# Patient Record
Sex: FEMALE | Race: WHITE | NOT HISPANIC OR LATINO | URBAN - METROPOLITAN AREA
[De-identification: names, ages, dates, MRNs, and addresses within clinical notes are randomized per-mention and may not be internally consistent; named-entity substitution may affect disease eponyms.]

---

## 2022-10-13 ENCOUNTER — APPOINTMENT (OUTPATIENT)
Dept: URBAN - METROPOLITAN AREA CLINIC 285 | Age: 57
Setting detail: DERMATOLOGY
End: 2022-10-14

## 2022-10-13 DIAGNOSIS — L65.9 NONSCARRING HAIR LOSS, UNSPECIFIED: ICD-10-CM

## 2022-10-13 PROCEDURE — 99202 OFFICE O/P NEW SF 15 MIN: CPT

## 2022-10-13 PROCEDURE — OTHER PRESCRIPTION: OTHER

## 2022-10-13 PROCEDURE — OTHER MIPS QUALITY: OTHER

## 2022-10-13 PROCEDURE — OTHER DEFER: OTHER

## 2022-10-13 PROCEDURE — OTHER MEDICATION COUNSELING: OTHER

## 2022-10-13 PROCEDURE — OTHER TREATMENT REGIMEN: OTHER

## 2022-10-13 PROCEDURE — OTHER COUNSELING: OTHER

## 2022-10-13 RX ORDER — DESOXIMETASONE 2.5 MG/ML
SPRAY TOPICAL
Qty: 100 | Refills: 1 | Status: ERX | COMMUNITY
Start: 2022-10-13

## 2022-10-13 ASSESSMENT — LOCATION DETAILED DESCRIPTION DERM: LOCATION DETAILED: MID-FRONTAL SCALP

## 2022-10-13 ASSESSMENT — LOCATION SIMPLE DESCRIPTION DERM: LOCATION SIMPLE: ANTERIOR SCALP

## 2022-10-13 ASSESSMENT — LOCATION ZONE DERM: LOCATION ZONE: SCALP

## 2022-10-13 NOTE — HPI: HAIR LOSS
How Did The Hair Loss Occur?: gradual in onset
How Severe Is Your Hair Loss?: mild
What Hair Products Do You Use?: Gluten free shampoo
Additional History: Pt has several food allergies and thinks that may be causing the sores. All medications given by previous dermatologist caused more irritation besides the Clindamycin

## 2022-10-13 NOTE — PROCEDURE: DEFER
X Size Of Lesion In Cm (Optional): 0
Detail Level: Detailed
Instructions (Optional): recommend pt consult with her allergist and consider contact derm patch testing ([id number])

## 2022-10-13 NOTE — PROCEDURE: MIPS QUALITY
Quality 130: Documentation Of Current Medications In The Medical Record: Current Medications Documented
Detail Level: Detailed
Quality 110: Preventive Care And Screening: Influenza Immunization: Influenza Immunization not Administered for Documented Reasons.
Quality 226: Preventive Care And Screening: Tobacco Use: Screening And Cessation Intervention: Patient screened for tobacco use and is an ex/non-smoker
Quality 431: Preventive Care And Screening: Unhealthy Alcohol Use - Screening: Patient not identified as an unhealthy alcohol user when screened for unhealthy alcohol use using a systematic screening method

## 2024-12-05 NOTE — PROCEDURE: TREATMENT REGIMEN
Case Management Assessment            Discharge Note                    Date / Time of Note: 12/5/2024 3:17 PM                  Discharge Note Completed by: BRYSON NI    Patient Name: Jonna Erickson   YOB: 1938  Diagnosis: Hypoxemia [R09.02]  Metastasis to brain (HCC) [C79.31]  Metastatic melanoma (HCC) [C43.9]   Date / Time: 12/4/2024  7:45 PM    Current PCP: Michael Luna MD  Clinic patient: No    Hospitalization in the last 30 days: Yes  Readmission Assessment  Number of Days since last admission?: 8-30 days  Previous Disposition: Home with Family  Who is being Interviewed: Patient  What was the patient's/caregiver's perception as to why they think they needed to return back to the hospital?: Other (Comment) (abnormal MRI)  Did you visit your Primary Care Physician after you left the hospital, before you returned this time?: No  Why weren't you able to visit your PCP?: Did not have an appointment  Did you see a specialist, such as Cardiac, Pulmonary, Orthopedic Physician, etc. after you left the hospital?: Yes  Who advised the patient to return to the hospital?: Physician  Does the patient report anything that got in the way of taking their medications?: No  In our efforts to provide the best possible care to you and others like you, can you think of anything that we could have done to help you after you left the hospital the first time, so that you might not have needed to return so soon?: Other (Comment) (nothing)    Advance Directives:  Code Status: Full Code  [place] DNR form completed and on chart: Not Indicated    Financial:  Payor: [employer] MEDICARE / Plan: [employer] CHOICE-PPO MEDICARE / Product Type: *No Product type* /      Pharmacy:    [facility] #[id number] - [place], [place] - [address] - P [phone] - F [phone]  [address]  [facility] [address]  Phone: [phone] Fax: [phone]      Assistance purchasing medications?: Potential Assistance Purchasing 
Oxygen Company: Not Applicable  Portable tank available for discharge?: Not Indicated    Dialysis:  Dialysis patient: No    Dialysis Center:  Not Applicable    Hospice Services:  Location: Not Applicable  Agency: Not Applicable    Consents signed: Not Indicated    Referrals made at DISCHARGE for outpatient continued care:  Not Applicable    Additional CM Notes: Discharge order noted. Patient to discharge home with [facility]. Family at bedside to transport home.    COVID Result:    Lab Results   Component Value Date/Time    COVID19 NOT DETECTED 11/08/2024 02:13 PM       The Plan for Transition of Care is related to the following treatment goals of Hypoxemia [R09.02]  Metastasis to brain (HCC) [C79.31]  Metastatic melanoma (HCC) [C43.9]    The Patient and/or patient representative Jonna and her family were provided with a choice of provider and agrees with the discharge plan Yes    Freedom of choice list was provided with basic dialogue that supports the patient's individualized plan of care/goals and shares the quality data associated with the providers. Yes    Care Transitions patient: Lexi NI  [facility]  Case Management Department  Ph: [phone]  Fax:     
Plan: Pt has several food allergies and think that may be causing the hair loss and irritation.Discussed doing additional patch testing to rule out additional contact allergies.
Samples Given: -Vanicream shampoo
Initiate Treatment: desoximetasone 0.25 % topical spray
Continue Regimen: -Clindamycin topical solution(Pt has some at home)
Detail Level: Zone